# Patient Record
Sex: MALE | Race: WHITE | URBAN - METROPOLITAN AREA
[De-identification: names, ages, dates, MRNs, and addresses within clinical notes are randomized per-mention and may not be internally consistent; named-entity substitution may affect disease eponyms.]

---

## 2018-02-01 ENCOUNTER — HOSPITAL ENCOUNTER (EMERGENCY)
Facility: CLINIC | Age: 28
Discharge: HOME | End: 2018-02-02
Attending: EMERGENCY MEDICINE | Admitting: EMERGENCY MEDICINE
Payer: COMMERCIAL

## 2018-02-01 DIAGNOSIS — F10.220 ACUTE ALCOHOLIC INTOXICATION IN ALCOHOLISM WITHOUT COMPLICATION (H): ICD-10-CM

## 2018-02-01 LAB — ALCOHOL BREATH TEST: 0.29 (ref 0–0.01)

## 2018-02-01 PROCEDURE — 82075 ASSAY OF BREATH ETHANOL: CPT | Performed by: EMERGENCY MEDICINE

## 2018-02-01 PROCEDURE — 99284 EMERGENCY DEPT VISIT MOD MDM: CPT | Mod: Z6 | Performed by: EMERGENCY MEDICINE

## 2018-02-01 PROCEDURE — 99285 EMERGENCY DEPT VISIT HI MDM: CPT | Performed by: EMERGENCY MEDICINE

## 2018-02-01 NOTE — ED AVS SNAPSHOT
[facility], Emergency Department    [address]    [place] [address]    Phone:  [phone]    Fax:  [phone]                                       Pascual Messina   MRN: [id number]    Department:  [facility], Emergency Department   Date of Visit:  2/1/2018           Patient Information     Date Of Birth          1990        Your diagnoses for this visit were:     Acute alcoholic intoxication in alcoholism without complication (H)        You were seen by Jayne White MD and Aziza Damian MD.        Discharge Instructions         Recovering from Addiction  Recovery means making a new life for yourself. This includes finding new interests. It involves building new relationships. It means taking better care of yourself. These will all help you replace substance use with a new and healthier life. They will also help you avoid the things that could make you want to use again.    Make lifestyle changes  A big part of recovery is changing habits. These are habits that may have led to your substance abuse. It s also a time for personal growth. Below are some changes you may want to make.    Find new activities and goals. You may want to try new hobbies and interests. Or, you may want to join an activity group to meet new people.    Build relationships. You may choose to spend more time with loved ones you lost touch with while you were using. You may also want to make new friends. And there may be some friends or family members you will not want to see because they are still using.    Exercise and eat well. Get some physical activity on most days. This can help you feel better. Eat healthy meals with lots of fruits, vegetables, and whole grains. This can also help your well-being. A nutritionist or fitness expert can help you.    Maintain ties with medical and addiction professionals. While you may not need intense professional support, it is important to have reliable safety nets so you can  access professional assistance when needed.    Relax and get enough sleep. Good sleep can help you feel better. So can less stress. Ask your counselor about relaxation exercises. These may include meditation. Also ask about stress management classes.  Date Last Reviewed: 2/1/2017 2000-2017 [facility]. [address] [place] [address]. All rights reserved. This information is not intended as a substitute for professional medical care. Always follow your healthcare professional's instructions.      If you decide you are interested in detox, call [phone] to check for inpatient bed availability.    24 Hour Appointment Hotline       To make an appointment at any [facility] clinic, call [phone] ([phone]). If you don't have a family doctor or clinic, we will help you find one. [facility] clinics are conveniently located to serve the needs of you and your family.             Review of your medicines      Notice     You have not been prescribed any medications.            Procedures and tests performed during your visit     Alcohol breath test POCT    Restraints Violent or Self-Destructive Adult (Age 18 and Older)    Restraints Violent or Self-Destructive Adult (Age 18 or Older)      Orders Needing Specimen Collection     None      Pending Results     No orders found for last 3 day(s).            Pending Culture Results     No orders found for last 3 day(s).            Pending Results Instructions     If you had any lab results that were not finalized at the time of your Discharge, you can call the ED Lab Result RN at [phone]. You will be contacted by this team for any positive Lab results or changes in treatment. The nurses are available 7 days a week from 10A to 6:30P.  You can leave a message 24 hours per day and they will return your call.        Thank you for choosing [facility]       Thank you for choosing [facility] for your care. Our goal is always to provide you with  "excellent care. Hearing back from our patients is one way we can continue to improve our services. Please take a few minutes to complete the written survey that you may receive in the mail after you visit with us. Thank you!        [employer] Information     [employer] lets you send messages to your doctor, view your test results, renew your prescriptions, schedule appointments and more. To sign up, go to [email or web address] . Click on \"Log in\" on the left side of the screen, which will take you to the Welcome page. Then click on \"Sign up Now\" on the right side of the page.     You will be asked to enter the access code listed below, as well as some personal information. Please follow the directions to create your username and password.     Your access code is: [email or web address]  Expires: 5/3/2018  6:00 AM     Your access code will  in 90 days. If you need help or a new code, please call your [facility] clinic or [phone].        Care EveryWhere ID     This is your Care EveryWhere ID. This could be used by other organizations to access your [facility] medical records  [id number]        Equal Access to Services     [facility] : Hadii neena Arvizu, megan godoy, carey dotson, jonna huynh . So [facility] [phone].    ATENCIÓN: Si habla español, tiene a rogel disposición servicios gratuitos de asistencia lingüística. Geovannaame al [phone].    We comply with applicable federal civil rights laws and [place] laws. We do not discriminate on the basis of race, color, national origin, age, disability, sex, sexual orientation, or gender identity.            After Visit Summary       This is your record. Keep this with you and show to your community pharmacist(s) and doctor(s) at your next visit.                  "

## 2018-02-01 NOTE — ED AVS SNAPSHOT
[facility], [place], Emergency Department    [address]    [facility] [address]    Phone:  [phone]    Fax:  [phone]                                       Pascual Messina   MRN: [id number]    Department:  [facility], Emergency Department   Date of Visit:  2/1/2018           After Visit Summary Signature Page     I have received my discharge instructions, and my questions have been answered. I have discussed any challenges I see with this plan with the nurse or doctor.    ..........................................................................................................................................  Patient/Patient Representative Signature      ..........................................................................................................................................  Patient Representative Print Name and Relationship to Patient    ..................................................               ................................................  Date                                            Time    ..........................................................................................................................................  Reviewed by Signature/Title    ...................................................              ..............................................  Date                                                            Time

## 2018-02-02 NOTE — ED PROVIDER NOTES
SIGN-OUT:  - Assumed care of this patient from Dr. White  - Pending at shift change / Tentative plan: Re-evaluation once sober    No changes while in ED during my shift.     SIGN-OUT:  - Patient signed out to overnight EM MD.  - Impression at shift change: Etoh intake  - Tentative plan: Reassess when sober         Aziza Damian MD  02/02/18 0416

## 2018-02-02 NOTE — ED PROVIDER NOTES
"    [facility] EMERGENCY DEPARTMENT ([facility] [place])    2/01/18     ED 14    History     Chief Complaint   Patient presents with     Alcohol Intoxication     brought in by sober house friends; swearing repeatedly to everyone, \"fuck you\"     HPI  Pascual Messina is a 27 year old male came today with friends due to intoxication.  He says he has been sober since May.  He became agitated, tells me he hates me and won't follow nursing and security requests.     I have reviewed the Medications, Allergies, Past Medical and Surgical History, and Social History in the Epic system.    Review of Systems   Unable to perform ROS: Psychiatric disorder       Physical Exam   BP:  (refusing at this time)      Physical Exam   Constitutional: He appears well-developed and well-nourished.   Intoxicated, swearing and yelling.    HENT:   Head: Atraumatic.   Eyes: No scleral icterus.   Neck: Normal range of motion.   Cardiovascular: Normal rate, regular rhythm and normal heart sounds.    Pulmonary/Chest: Effort normal and breath sounds normal.   Abdominal: Soft. There is no tenderness.   Neurological: He is alert.   Skin: Skin is warm and dry.   Psychiatric: His affect is angry. His speech is slurred. He is agitated. He expresses impulsivity.       ED Course     ED Course     Procedures           Labs Ordered and Resulted from Time of ED Arrival Up to the Time of Departure from the ED   ALCOHOL BREATH TEST POCT - Abnormal; Notable for the following:        Result Value    Alcohol Breath Test 0.291 (*)     All other components within normal limits            Assessments & Plan (with Medical Decision Making)   The patient became agitated when he entered the ED.  He arrived intoxicated.  He was not cooperative, was yelling and swearing.  He became a bit less agitated when I began to talk to him.  He tells me he hasn't drank since May until today.  He also tells me he hates me.  He started to yell and was placed in seclusion.  He fell " asleep.  Nursing was able to get a breathalizer at one point and he was intoxicated at .29.   Seclusion was discontinued.  We will allow him to sober up and then reassess.      I have reviewed the nursing notes.    I have reviewed the findings, diagnosis, plan and need for follow up with the patient.    New Prescriptions    No medications on file       Final diagnoses:   Acute alcoholic intoxication in alcoholism without complication (H)       2/1/2018   [facility], [place], EMERGENCY DEPARTMENT     Jayne White MD  02/01/18 1955

## 2018-02-02 NOTE — ED NOTES
02/01/18 1920   Seclusion or Restraint Order   In Person Face to Face Assessment Conducted Yes-Eval of pt's immediate situation, reaction to intervention, complete review of systems assessment, behavioral assessment & review/assessment of hx, drugs & meds, recent labs, etc, behavioral condition, need to continue/terminate restraint/seclusion   Patient Experienced No adverse physical outcome from seclusion/restraint initiation   Continuation of Seclus/Restraint indicated at this time Yes   RN face to face assessment completed. Patient placed in seclusion. All lesser restrictive measures taken with no success.  No s/s of injury observed. MD notified

## 2018-02-02 NOTE — ED NOTES
In person face to face assessment completed, including an evaluation of the patient's immediate reaction to the intervention, complete review of systems assessment, behavioral assessment and review/assessment of history, drugs and medications, recent labs, etc., and behavioral condition.  The patient experienced: No adverse physical outcome from seclusion/restraint initiation.  The intervention of restraint or seclusion needs to terminate.     Jayne White MD  02/01/18 1952

## 2018-02-02 NOTE — ED NOTES
"Patient placed in seclusion. Pt intoxicated and unable to follow directives. Patient yelling and swearing at staff. Pt continuously tells staff \"fuck you\". MD notified   "

## 2018-02-02 NOTE — ED NOTES
The patient is still sleeping and we will continue to observe until awake, and then reassess him at that time.  He will be signed out to the overnight team.      Jayne White MD  02/01/18 2324

## 2018-02-02 NOTE — ED NOTES
02/01/18 1925   Restraint Monitoring Q15 Minutes   Psychological Status O  (Asleep)   Physical Comfort D   Circulation NS   Continuous Observation Yes   Restraint Type   Seclusion (BH) Discontinued   Debriefing   Debriefing DO  (pt now resting calm)   Does patient understand why the event happened? Patient unable to answer   Does patient agree to safe behaviors? Patient unable to answer   What can we do differently so this doesn't happen again? Patient unable to answer   Plan of care reviewed and modified Yes   Seclusion discontinued. Patient is resting calm on mat in room. Continue to monitor and assess.

## 2018-02-02 NOTE — DISCHARGE INSTRUCTIONS
Recovering from Addiction  Recovery means making a new life for yourself. This includes finding new interests. It involves building new relationships. It means taking better care of yourself. These will all help you replace substance use with a new and healthier life. They will also help you avoid the things that could make you want to use again.    Make lifestyle changes  A big part of recovery is changing habits. These are habits that may have led to your substance abuse. It s also a time for personal growth. Below are some changes you may want to make.    Find new activities and goals. You may want to try new hobbies and interests. Or, you may want to join an activity group to meet new people.    Build relationships. You may choose to spend more time with loved ones you lost touch with while you were using. You may also want to make new friends. And there may be some friends or family members you will not want to see because they are still using.    Exercise and eat well. Get some physical activity on most days. This can help you feel better. Eat healthy meals with lots of fruits, vegetables, and whole grains. This can also help your well-being. A nutritionist or fitness expert can help you.    Maintain ties with medical and addiction professionals. While you may not need intense professional support, it is important to have reliable safety nets so you can access professional assistance when needed.    Relax and get enough sleep. Good sleep can help you feel better. So can less stress. Ask your counselor about relaxation exercises. These may include meditation. Also ask about stress management classes.  Date Last Reviewed: 2/1/2017 2000-2017 [employer]. [address] [place] [address], [place] [address]. All rights reserved. This information is not intended as a substitute for professional medical care. Always follow your healthcare professional's instructions.      If you decide you are interested in  detox, call [phone] to check for inpatient bed availability.

## 2018-02-02 NOTE — ED PROVIDER NOTES
Patient was accepted at shift pain signed out with a plan to monitor in the Emergency Department until morning and then assess.  Apparently the patient was unwilling to speak with the provider last night, so the provider really did not have any information as to why he was here, what were the circumstances.  This morning he is awake, polite, cooperative.  He states he thinks that he drank way too much last night.  He states that he has no physical complaints now, other than mild headache, as he feels that he has hung over.  He denies any illness or injury.  He denies any suicidal ideation.  He will thus be discharged home.  He is clinically sober at this time.    This part of the medical record was transcribed by Jorge Alberto Guerin, Medical Scribe, from a dictation done by Sylvia Roberts MD.     Sylvia Roberts MD  February 2, 2018 5:58 AM